# Patient Record
Sex: MALE | HISPANIC OR LATINO | Employment: FULL TIME | URBAN - METROPOLITAN AREA
[De-identification: names, ages, dates, MRNs, and addresses within clinical notes are randomized per-mention and may not be internally consistent; named-entity substitution may affect disease eponyms.]

---

## 2019-07-29 ENCOUNTER — APPOINTMENT (OUTPATIENT)
Dept: RADIOLOGY | Facility: MEDICAL CENTER | Age: 24
End: 2019-07-29
Attending: EMERGENCY MEDICINE
Payer: OTHER MISCELLANEOUS

## 2019-07-29 ENCOUNTER — HOSPITAL ENCOUNTER (EMERGENCY)
Facility: MEDICAL CENTER | Age: 24
End: 2019-07-30
Attending: EMERGENCY MEDICINE
Payer: OTHER MISCELLANEOUS

## 2019-07-29 DIAGNOSIS — R07.81 RIB PAIN: ICD-10-CM

## 2019-07-29 DIAGNOSIS — S29.019A ACUTE THORACIC MYOFASCIAL STRAIN, INITIAL ENCOUNTER: ICD-10-CM
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
## 2019-07-29 DIAGNOSIS — S16.1XXA ACUTE CERVICAL MYOFASCIAL STRAIN, INITIAL ENCOUNTER: ICD-10-CM

## 2019-07-29 PROCEDURE — A9270 NON-COVERED ITEM OR SERVICE: HCPCS | Performed by: EMERGENCY MEDICINE

## 2019-07-29 PROCEDURE — 96372 THER/PROPH/DIAG INJ SC/IM: CPT

## 2019-07-29 PROCEDURE — 700111 HCHG RX REV CODE 636 W/ 250 OVERRIDE (IP): Performed by: EMERGENCY MEDICINE

## 2019-07-29 PROCEDURE — 72070 X-RAY EXAM THORAC SPINE 2VWS: CPT

## 2019-07-29 PROCEDURE — 72040 X-RAY EXAM NECK SPINE 2-3 VW: CPT

## 2019-07-29 PROCEDURE — 99284 EMERGENCY DEPT VISIT MOD MDM: CPT

## 2019-07-29 PROCEDURE — 700102 HCHG RX REV CODE 250 W/ 637 OVERRIDE(OP): Performed by: EMERGENCY MEDICINE

## 2019-07-29 RX ORDER — KETOROLAC TROMETHAMINE 30 MG/ML
60 INJECTION, SOLUTION INTRAMUSCULAR; INTRAVENOUS ONCE
Status: COMPLETED | OUTPATIENT
Start: 2019-07-29 | End: 2019-07-29

## 2019-07-29 RX ORDER — METHOCARBAMOL 500 MG/1
1000 TABLET, FILM COATED ORAL ONCE
Status: COMPLETED | OUTPATIENT
Start: 2019-07-29 | End: 2019-07-29

## 2019-07-29 RX ADMIN — METHOCARBAMOL 1000 MG: 500 TABLET, FILM COATED ORAL at 22:57

## 2019-07-29 RX ADMIN — KETOROLAC TROMETHAMINE 60 MG: 30 INJECTION, SOLUTION INTRAMUSCULAR at 22:56

## 2019-07-30 VITALS
OXYGEN SATURATION: 99 % | DIASTOLIC BLOOD PRESSURE: 91 MMHG | HEIGHT: 72 IN | RESPIRATION RATE: 19 BRPM | BODY MASS INDEX: 25.14 KG/M2 | TEMPERATURE: 98 F | SYSTOLIC BLOOD PRESSURE: 136 MMHG | WEIGHT: 185.63 LBS | HEART RATE: 52 BPM

## 2019-07-30 RX ORDER — METHOCARBAMOL 500 MG/1
500 TABLET, FILM COATED ORAL 4 TIMES DAILY
Qty: 28 TAB | Refills: 0 | Status: SHIPPED | OUTPATIENT
Start: 2019-07-30 | End: 2021-12-26

## 2019-07-30 RX ORDER — KETOROLAC TROMETHAMINE 10 MG/1
10 TABLET, FILM COATED ORAL 3 TIMES DAILY PRN
Qty: 15 TAB | Refills: 0 | Status: SHIPPED | OUTPATIENT
Start: 2019-07-30 | End: 2021-12-26

## 2019-07-30 NOTE — ED NOTES
Pt given d/c paperwork and prescriptions, pt verbalized understanding all information given. Pt ambulated out of the ER w/o difficulty/

## 2019-07-30 NOTE — DISCHARGE INSTRUCTIONS
Ice for 24 hours then moist heat or hot tub soaks in Epsom salts.  Take medications as directed with food  Rest for the next 1 to 2 days and expect to be more sore within the next 2 days.  Follow-up with your primary care provider or the [facility] clinic in 1 week for recheck  Return if any worsening symptoms
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
## 2021-07-08 ENCOUNTER — OFFICE VISIT (OUTPATIENT)
Dept: URGENT CARE | Facility: CLINIC | Age: 26
End: 2021-07-08

## 2021-07-08 VITALS
RESPIRATION RATE: 16 BRPM | BODY MASS INDEX: 24.52 KG/M2 | HEIGHT: 73 IN | SYSTOLIC BLOOD PRESSURE: 128 MMHG | HEART RATE: 66 BPM | WEIGHT: 185 LBS | OXYGEN SATURATION: 97 % | DIASTOLIC BLOOD PRESSURE: 92 MMHG | TEMPERATURE: 98.7 F

## 2021-07-08 DIAGNOSIS — S61.412A LACERATION OF LEFT HAND WITHOUT FOREIGN BODY, INITIAL ENCOUNTER: ICD-10-CM

## 2021-07-08 PROCEDURE — 12001 RPR S/N/AX/GEN/TRNK 2.5CM/<: CPT | Performed by: PHYSICIAN ASSISTANT

## 2021-07-08 ASSESSMENT — ENCOUNTER SYMPTOMS
BRUISES/BLEEDS EASILY: 0
CHILLS: 0
HEADACHES: 0
MYALGIAS: 1
DIZZINESS: 0
FEVER: 0
ROS SKIN COMMENTS: LACERATION TO LEFT HAND

## 2021-07-08 NOTE — LETTER
[facility]  [facility] [address]  [place] [address]     July 8, 2021    Patient: Chau Emanuel   YOB: 1995   Date of Visit: 7/8/2021       To Whom It May Concern:    Chau Emanuel was seen and treated in our department on 7/8/2021.  Please allow for light duty over the next 7 days.  He sustained a hand laceration to his left hand.  Please allow him to keep this clean dry and covered during work.  If possible limit repetitive motion with right hand.  Call with questions or concerns at [phone].    Sincerely,     Silvestre Jones P.A.-C.

## 2021-07-09 NOTE — PROCEDURES
Laceration Repair    Date/Time: 7/8/2021 6:08 PM  Performed by: Silvestre Jones P.A.-C.  Authorized by: Silvestre Jones P.A.-C.   Body area: upper extremity  Location details: left hand  Laceration length: 2 cm  Foreign bodies: no foreign bodies  Tendon involvement: none  Nerve involvement: none  Vascular damage: no  Anesthesia: local infiltration    Anesthesia:  Local Anesthetic: lidocaine 1% without epinephrine  Anesthetic total: 2 mL    Sedation:  Patient sedated: no    Preparation: Patient was prepped and draped in the usual sterile fashion.  Irrigation solution: saline  Irrigation method: syringe  Amount of cleaning: standard  Debridement: none  Degree of undermining: none  Skin closure: 5-0 nylon  Number of sutures: 3  Technique: simple  Approximation: close  Approximation difficulty: simple  Dressing: 4x4 sterile gauze and antibiotic ointment  Patient tolerance: patient tolerated the procedure well with no immediate complications

## 2021-07-09 NOTE — PROGRESS NOTES
"Subjective:   Chau Emanuel is a 25 y.o. male who presents for Laceration (x 2 hrs ago, on left hand )      Laceration   The incident occurred 1 to 3 hours ago. The laceration is located on the left hand. The laceration is 2 cm in size. The laceration mechanism was a razor. The pain is mild. The pain has been improving since onset. He reports no foreign bodies present. His tetanus status is out of date.   Patient accidentally sliced his left hand with a .  Was able to get the bleeding controlled with compression.  Tried superglue in the hand however was unable to approximate edges.  Denies any numbness or tingling distally.  No loss of range of motion any of the digits.    Review of Systems   Constitutional: Negative for chills and fever.   Musculoskeletal: Positive for myalgias.   Skin:        Laceration to left hand   Neurological: Negative for dizziness and headaches.   Endo/Heme/Allergies: Does not bruise/bleed easily.       Medications:    • ketorolac Tabs  • methocarbamol Tabs    Allergies: Patient has no known allergies.    Problem List: Chau Emanuel does not have a problem list on file.    Surgical History:  No past surgical history on file.    Past Social Hx: Chau Emanuel  reports that he has been smoking cigarettes. He has never used smokeless tobacco. He reports previous alcohol use. He reports that he does not use drugs.     Past Family Hx:  Chau Emanuel family history is not on file.     Problem list, medications, and allergies reviewed by myself today in Epic.     Objective:     /92   Pulse 66   Temp 37.1 °C (98.7 °F) (Temporal)   Resp 16   Ht 1.854 m (6' 1\")   Wt 83.9 kg (185 lb)   SpO2 97%   BMI 24.41 kg/m²     Physical Exam  Constitutional:       General: He is not in acute distress.     Appearance: Normal appearance. He is not ill-appearing, toxic-appearing or diaphoretic.   Eyes:      Conjunctiva/sclera: Conjunctivae normal.   Cardiovascular:      Rate and Rhythm: " Normal rate and regular rhythm.   Musculoskeletal:        Hands:       Comments: 2 cm linear laceration over the thenar aspect palmar surface of the left hand laceration is closely approximated.  No underlying foreign bodies appreciated.  No underlying neurovascular damage.  Bleeding controlled.  Sensation intact distally.  Is able to move the thumb and all digits without complication.   Skin:     Capillary Refill: Capillary refill takes less than 2 seconds.   Neurological:      General: No focal deficit present.      Mental Status: He is alert and oriented to person, place, and time. Mental status is at baseline.           Assessment/Plan:     Comments/MDM:     • Patient sustained a laceration.  See laceration repair for details.  • Requested that the patient update his tetanus however he declined respectfully in clinic today.  • Wound care instructions were discussed at length.  • Follow-up in the next 7 to 10 days for suture removal.  • Red flag symptoms and signs of infection were discussed with the patient today.  He understands if any of these present return to clinic or the nearest ED.  Call with questions or concerns.     Diagnosis and associated orders:     1. Laceration of left hand without foreign body, initial encounter              Differential diagnosis, natural history, supportive care, and indications for immediate follow-up discussed.    Advised the patient to follow-up with the primary care physician for recheck, reevaluation, and consideration of further management.    Please note that this dictation was created using voice recognition software. I have made reasonable attempt to correct obvious errors, but I expect that there are errors of grammar and possibly content that I did not discover before finalizing the note.    This note was electronically signed by JB Jones PA-C

## 2021-12-26 ENCOUNTER — OFFICE VISIT (OUTPATIENT)
Dept: URGENT CARE | Facility: CLINIC | Age: 26
End: 2021-12-26

## 2021-12-26 VITALS
OXYGEN SATURATION: 98 % | TEMPERATURE: 97 F | DIASTOLIC BLOOD PRESSURE: 78 MMHG | RESPIRATION RATE: 16 BRPM | HEART RATE: 81 BPM | SYSTOLIC BLOOD PRESSURE: 118 MMHG

## 2021-12-26 DIAGNOSIS — K04.7 DENTAL ABSCESS: ICD-10-CM

## 2021-12-26 DIAGNOSIS — K04.7 DENTAL INFECTION: ICD-10-CM

## 2021-12-26 PROCEDURE — 99214 OFFICE O/P EST MOD 30 MIN: CPT | Performed by: NURSE PRACTITIONER

## 2021-12-26 RX ORDER — AMOXICILLIN 500 MG/1
500 CAPSULE ORAL 2 TIMES DAILY
Qty: 20 CAPSULE | Refills: 0 | Status: SHIPPED | OUTPATIENT
Start: 2021-12-26 | End: 2022-01-05

## 2021-12-26 ASSESSMENT — ENCOUNTER SYMPTOMS
VOMITING: 0
FEVER: 0
DIZZINESS: 0
SINUS PRESSURE: 0
CHILLS: 0
EYE PAIN: 0
SHORTNESS OF BREATH: 0
MYALGIAS: 0
SORE THROAT: 0
NAUSEA: 0

## 2021-12-27 NOTE — PROGRESS NOTES
Subjective:   Chau Emanuel is a 26 y.o. male who presents for Dental Pain (left side with swelling x1 day )      Dental Pain   This is a new problem. The current episode started yesterday. The problem occurs constantly. The problem has been gradually worsening. The pain is at a severity of 8/10. The pain is moderate. Associated symptoms include facial pain. Pertinent negatives include no difficulty swallowing, fever, oral bleeding, sinus pressure or thermal sensitivity. He has tried acetaminophen for the symptoms. The treatment provided no relief.       Review of Systems   Constitutional: Negative for chills and fever.   HENT: Negative for sinus pressure and sore throat.         Left side dental pain with swelling      Eyes: Negative for pain.   Respiratory: Negative for shortness of breath.    Cardiovascular: Negative for chest pain.   Gastrointestinal: Negative for nausea and vomiting.   Genitourinary: Negative for hematuria.   Musculoskeletal: Negative for myalgias.   Skin: Negative for rash.   Neurological: Negative for dizziness.       Medications:    • amoxicillin Caps    Allergies: Patient has no known allergies.    Problem List: Chau Emanuel does not have a problem list on file.    Surgical History:  No past surgical history on file.    Past Social Hx: Chau Emanuel  reports that he has been smoking cigarettes. He has never used smokeless tobacco. He reports previous alcohol use. He reports that he does not use drugs.     Past Family Hx:  Chau Emanuel family history is not on file.     Problem list, medications, and allergies reviewed by myself today in Epic.     Objective:     /78   Pulse 81   Temp 36.1 °C (97 °F)   Resp 16   SpO2 98%     Physical Exam  Constitutional:       Appearance: Normal appearance. He is not ill-appearing or toxic-appearing.   HENT:      Head: Normocephalic.        Right Ear: External ear normal.      Left Ear: External ear normal.      Nose: Nose normal.       Mouth/Throat:      Lips: Pink.      Mouth: Mucous membranes are moist.      Dentition: Dental abscesses present.     Eyes:      General: Lids are normal.         Right eye: No discharge.         Left eye: No discharge.   Pulmonary:      Effort: Pulmonary effort is normal. No accessory muscle usage or respiratory distress.   Musculoskeletal:         General: Normal range of motion.      Cervical back: Full passive range of motion without pain.   Skin:     Coloration: Skin is not pale.   Neurological:      Mental Status: He is alert and oriented to person, place, and time.   Psychiatric:         Mood and Affect: Mood normal.         Thought Content: Thought content normal.         Assessment/Plan:     Diagnosis and associated orders:     1. Dental infection  amoxicillin (AMOXIL) 500 MG Cap   2. Dental abscess          Comments/MDM:     • Use over-the-counter pain reliever, such as acetaminophen (Tylenol), ibuprofen (Advil, Motrin) or naproxen (Aleve) as needed; follow package directions for dosing.  Advised follow-up with dentist as soon as possible.  • Differential diagnosis, natural history, supportive care, and indications for immediate follow-up discussed.                Please note that this dictation was created using voice recognition software. I have made a reasonable attempt to correct obvious errors, but I expect that there are errors of grammar and possibly content that I did not discover before finalizing the note.    This note was electronically signed by Darryl KHALIL.

## 2023-09-27 PROBLEM — S62.337A CLOSED DISPLACED FRACTURE OF NECK OF LEFT FIFTH METACARPAL BONE: Status: ACTIVE | Noted: 2023-09-27

## 2023-12-05 ENCOUNTER — OFFICE VISIT (OUTPATIENT)
Dept: URGENT CARE | Facility: CLINIC | Age: 28
End: 2023-12-05
Payer: COMMERCIAL

## 2023-12-05 VITALS
HEIGHT: 72 IN | SYSTOLIC BLOOD PRESSURE: 110 MMHG | DIASTOLIC BLOOD PRESSURE: 68 MMHG | WEIGHT: 179 LBS | HEART RATE: 97 BPM | BODY MASS INDEX: 24.24 KG/M2 | RESPIRATION RATE: 18 BRPM | TEMPERATURE: 98 F | OXYGEN SATURATION: 98 %

## 2023-12-05 DIAGNOSIS — K04.7 DENTAL ABSCESS: Primary | ICD-10-CM

## 2023-12-05 PROCEDURE — 99213 OFFICE O/P EST LOW 20 MIN: CPT

## 2023-12-05 PROCEDURE — 3078F DIAST BP <80 MM HG: CPT

## 2023-12-05 PROCEDURE — 3074F SYST BP LT 130 MM HG: CPT

## 2023-12-05 RX ORDER — CHLORHEXIDINE GLUCONATE ORAL RINSE 1.2 MG/ML
15 SOLUTION DENTAL 2 TIMES DAILY
Qty: 210 ML | Refills: 0 | Status: SHIPPED | OUTPATIENT
Start: 2023-12-05 | End: 2023-12-12

## 2023-12-05 RX ORDER — AMOXICILLIN AND CLAVULANATE POTASSIUM 875; 125 MG/1; MG/1
1 TABLET, FILM COATED ORAL 2 TIMES DAILY
Qty: 14 TABLET | Refills: 0 | Status: SHIPPED | OUTPATIENT
Start: 2023-12-05 | End: 2023-12-12

## 2023-12-05 RX ORDER — IBUPROFEN 200 MG
200 TABLET ORAL EVERY 6 HOURS PRN
COMMUNITY

## 2023-12-05 ASSESSMENT — ENCOUNTER SYMPTOMS
VOMITING: 0
CHILLS: 0
BACK PAIN: 0
NECK PAIN: 0
FEVER: 0
SORE THROAT: 0
MYALGIAS: 0
PALPITATIONS: 0
ABDOMINAL PAIN: 0
NAUSEA: 0

## 2023-12-06 NOTE — PROGRESS NOTES
Subjective:   Chau Emanuel is a 28 y.o. male who presents for Pain (Tooth pain ,top right  molar x 1 week ,unable to get into dentist )          I introduced myself to the patient and informed them that I am a family nurse practitioner.    HPI:Chau comes in today c/o tooth ache, facial pain and swelling. Onset was 1 week ago.  Patient describes symptoms as constant, worsening. They describe the pain as sharp, throbbing, aching, 8 out of 10. Aggravating factors include eating, chewing, touching the area. Relieving factors include none. Treatments tried at home include Tylenol with minimal effect. They describe their symptoms as moderate to severe.  States he has called to get a dental appointment is unable to get into see a dentist until next week.      Review of Systems   Constitutional:  Negative for chills, fever and malaise/fatigue.   HENT:  Negative for congestion, ear pain and sore throat.         Positive for R maxillary pain   Cardiovascular:  Negative for chest pain and palpitations.   Gastrointestinal:  Negative for abdominal pain, nausea and vomiting.   Musculoskeletal:  Negative for back pain, myalgias and neck pain.       Medications: amoxicillin  ibuprofen Tabs    Allergies: Patient has no known allergies.    Problem List: does not have any pertinent problems on file.    Surgical History:  No past surgical history on file.    Past Social Hx:   reports that he has been smoking cigarettes. He has never used smokeless tobacco. He reports that he does not currently use alcohol. He reports that he does not use drugs.     Past Family Hx:   family history is not on file.     Problem list, medications, and allergies reviewed by myself today in Epic.   I have documented what I find to be significant in regards to past medical, social, family and surgical history  in my HPI or under PMH/PSH/FH review section, otherwise it is noncontributory     Objective:     /68   Pulse 97   Temp 36.7 °C (98 °F)  (Temporal)   Resp 18   Ht 1.829 m (6')   Wt 81.2 kg (179 lb)   SpO2 98%   BMI 24.28 kg/m²     During this visit, appropriate PPE was worn, and hand hygiene was performed.    Physical Exam  Vitals reviewed.   Constitutional:       General: He is not in acute distress.     Appearance: Normal appearance. He is not ill-appearing or toxic-appearing.   HENT:      Head: Normocephalic and atraumatic.      Right Ear: External ear normal.      Left Ear: External ear normal.      Nose: No congestion or rhinorrhea.      Mouth/Throat:      Dentition: Abnormal dentition. Dental tenderness, gingival swelling, dental caries and dental abscesses present.     Eyes:      General: No scleral icterus.        Right eye: No discharge.         Left eye: No discharge.      Extraocular Movements: Extraocular movements intact.      Conjunctiva/sclera: Conjunctivae normal.   Pulmonary:      Effort: Pulmonary effort is normal.   Abdominal:      General: There is no distension.      Palpations: Abdomen is soft.   Musculoskeletal:         General: Normal range of motion.      Cervical back: Normal range of motion. No rigidity.      Right lower leg: No edema.      Left lower leg: No edema.   Skin:     General: Skin is warm and dry.      Capillary Refill: Capillary refill takes 2 to 3 seconds.      Coloration: Skin is not jaundiced.   Neurological:      General: No focal deficit present.      Mental Status: He is alert and oriented to person, place, and time. Mental status is at baseline.      Gait: Gait normal.   Psychiatric:         Mood and Affect: Mood normal.         Behavior: Behavior normal.         Thought Content: Thought content normal.         Judgment: Judgment normal.         Assessment/Plan:     Diagnosis and associated orders:     1. Dental abscess  amoxicillin-clavulanate (AUGMENTIN) 875-125 MG Tab    chlorhexidine (PERIDEX) 0.12 % Solution         Comments/MDM:     1. Dental abscess  Instructed patient that his presentation  symptoms and physical exam are consistent with dental caries and dental abscess.  Instructed patient regarding Augmentin, Peridex, purpose, side effects, precautions.  Instructed patient regarding taking Tylenol and ibuprofen for pain and inflammation, discussed appropriate dosages, did discuss precautions.  We discussed red flags and reasons to return to urgent care versus when to go to the emergency room  Instructed patient to get into see dentist ASAP.  Call around 4 emergency dental appointment.  Patient states he understands all instructions and is agreeable with the plan of care  - amoxicillin-clavulanate (AUGMENTIN) 875-125 MG Tab; Take 1 Tablet by mouth 2 times a day for 7 days.  Dispense: 14 Tablet; Refill: 0  - chlorhexidine (PERIDEX) 0.12 % Solution; Take 15 mL by mouth 2 times a day for 7 days.  Dispense: 210 mL; Refill: 0         Pt is clinically stable at today's acute urgent care visit. Vital signs are normal and reassuring.  No acute distress noted. Appropriate for outpatient management at this time.       Discussed DDx, management options (risks,benefits, and alternatives to planned treatment), natural progression and supportive care.  Patient states they have good understanding and the treatment plan was agreed upon. Questions were encouraged and answered   Return to urgent care prn if new or worsening sx or if there is no improvement in condition prn.    Advised the patient to follow-up with the primary care physician for recheck, reevaluation, and consideration of further management.  I instructed patient to get a pharmacy consult when picking up any prescribed medications.  Strict ER precautions discussed for any  chest pain, difficulty breathing, difficulty swallowing, wheezing, stridor, or drooling, fever that does not respond to ibuprofen and Tylenol, inability to tolerate fluids, dehydration, lethargy.   Patient states they understand all instructions.     I personally reviewed prior  external notes and test results pertinent to today's visit.  I have independently reviewed and interpreted all diagnostics ordered during this urgent care acute visit.        Please note that this dictation was created using voice recognition software. I have made a reasonable attempt to correct obvious errors, but I expect that there are errors of grammar and possibly content that I did not discover before finalizing the note.    This note was electronically signed by Geovanni KHALIL, ANDREINA, LANA, POONAM

## 2024-05-07 ENCOUNTER — OFFICE VISIT (OUTPATIENT)
Dept: URGENT CARE | Facility: CLINIC | Age: 29
End: 2024-05-07
Payer: COMMERCIAL

## 2024-05-07 VITALS
WEIGHT: 164.2 LBS | DIASTOLIC BLOOD PRESSURE: 72 MMHG | BODY MASS INDEX: 22.24 KG/M2 | HEIGHT: 72 IN | SYSTOLIC BLOOD PRESSURE: 118 MMHG | TEMPERATURE: 97.8 F | HEART RATE: 65 BPM | RESPIRATION RATE: 18 BRPM | OXYGEN SATURATION: 98 %

## 2024-05-07 DIAGNOSIS — K04.7 DENTAL INFECTION: ICD-10-CM

## 2024-05-07 PROCEDURE — 99213 OFFICE O/P EST LOW 20 MIN: CPT | Performed by: PHYSICIAN ASSISTANT

## 2024-05-07 PROCEDURE — 3074F SYST BP LT 130 MM HG: CPT | Performed by: PHYSICIAN ASSISTANT

## 2024-05-07 PROCEDURE — 3078F DIAST BP <80 MM HG: CPT | Performed by: PHYSICIAN ASSISTANT

## 2024-05-07 RX ORDER — AMOXICILLIN AND CLAVULANATE POTASSIUM 875; 125 MG/1; MG/1
1 TABLET, FILM COATED ORAL 2 TIMES DAILY
Qty: 14 TABLET | Refills: 0 | Status: SHIPPED | OUTPATIENT
Start: 2024-05-07 | End: 2024-05-14

## 2024-05-07 ASSESSMENT — ENCOUNTER SYMPTOMS: FEVER: 0

## 2024-05-07 NOTE — LETTER
May 7, 2024         Patient: Chau Emanuel   YOB: 1995   Date of Visit: 5/7/2024           To Whom it May Concern:    Chau Emanuel was seen in my clinic on 5/7/2024. Please excuse him from work 5/7. He may return to work on 5/8/2024.    If you have any questions or concerns, please don't hesitate to call.        Sincerely,           Megan Garcia P.A.-C.  Electronically Signed

## 2024-05-07 NOTE — PROGRESS NOTES
Subjective     Chau Emanuel is a 28 y.o. male who presents with Edema (X2 dasy, swelling right side of mouth, tooth pain, )          This is a new problem.  The patient presents to clinic complaining of a possible dental infection x 1 day.  The patient states he recently broke his right upper second molar.  The patient states yesterday he developed increased dental pain with swelling of the right side of his face.  He reports no facial pain.  He also reports no fever.  No oral bleeding.  The patient has taken OTC ibuprofen for his current symptoms.    Dental Problems   This is a new problem. Episode onset: x 1 day ago. The problem occurs constantly. The problem has been unchanged. The pain is mild. Pertinent negatives include no facial pain, fever or oral bleeding. He has tried NSAIDs for the symptoms.     PMH:  has no past medical history on file.  MEDS:   Current Outpatient Medications:     ibuprofen (MOTRIN) 200 MG Tab, Take 200 mg by mouth every 6 hours as needed. (Patient not taking: Reported on 5/7/2024), Disp: , Rfl:   ALLERGIES: No Known Allergies  SURGHX: No past surgical history on file.  SOCHX:  reports that he has been smoking cigarettes. He has never used smokeless tobacco. He reports current alcohol use of about 7.2 oz of alcohol per week. He reports that he does not use drugs.  FH: Family history was reviewed, no pertinent findings to report      Review of Systems   Constitutional:  Negative for fever.   HENT:  Positive for dental problem.               Objective     /72 (BP Location: Right arm, Patient Position: Sitting, BP Cuff Size: Adult)   Pulse 65   Temp 36.6 °C (97.8 °F) (Temporal)   Resp 18   Ht 1.829 m (6')   Wt 74.5 kg (164 lb 3.2 oz)   SpO2 98%   BMI 22.27 kg/m²      Physical Exam  Constitutional:       General: He is not in acute distress.     Appearance: Normal appearance. He is well-developed. He is not ill-appearing.   HENT:      Head: Normocephalic and atraumatic.       Comments:   A localized area of edema is present to the patient's right cheek.  No tenderness to palpation.  No overlying erythema.  No increased warmth.  No palp induration.  No palpable fluctuance.     Right Ear: External ear normal.      Left Ear: External ear normal.      Mouth/Throat:      Dentition: Abnormal dentition. Dental tenderness and gingival swelling present. No dental abscesses.        Comments:   The patient's right upper second molar is broken at the gumline with associated gingival swelling.  No pinpoint dental abscess.  Eyes:      Extraocular Movements: Extraocular movements intact.      Conjunctiva/sclera: Conjunctivae normal.   Cardiovascular:      Rate and Rhythm: Normal rate.   Pulmonary:      Effort: Pulmonary effort is normal.   Musculoskeletal:      Cervical back: Normal range of motion and neck supple.   Skin:     General: Skin is warm and dry.   Neurological:      Mental Status: He is alert and oriented to person, place, and time.                             Assessment & Plan          1. Dental infection  - amoxicillin-clavulanate (AUGMENTIN) 875-125 MG Tab; Take 1 Tablet by mouth 2 times a day for 7 days.  Dispense: 14 Tablet; Refill: 0    Differential diagnoses, supportive care, and indications for immediate follow-up discussed with patient.   Instructed to return to clinic or nearest emergency department for any change in condition, further concerns, or worsening of symptoms.    OTC Tylenol or Motrin for fever/discomfort.  Ice  Drink plenty of fluids  Follow-up with dentist as soon as possible  Return to clinic or go to the ED if symptoms worsen or fail to improve, or if the patient should develop worsening/increasing dental pain, facial swelling, redness or warmth to the affected area, difficulty swallowing, shortness of breath, fever/chills, and/or any concerning symptoms.    Discussed plan with the patient, and he agrees to the above.    I personally reviewed prior external notes  and test results pertinent to today's visit.  I have independently reviewed and interpreted all diagnostics ordered during this urgent care visit.     Please note that this dictation was created using voice recognition software. I have made every reasonable attempt to correct obvious errors, but I expect that there may be errors of grammar and possibly content that I did not discover before finalizing the note.     This note was electronically signed by Megan Garcia PA-C

## 2024-05-17 ENCOUNTER — SUPERVISING PHYSICIAN REVIEW (OUTPATIENT)
Dept: URGENT CARE | Facility: CLINIC | Age: 29
End: 2024-05-17
Payer: COMMERCIAL